# Patient Record
Sex: FEMALE | Race: OTHER | URBAN - METROPOLITAN AREA
[De-identification: names, ages, dates, MRNs, and addresses within clinical notes are randomized per-mention and may not be internally consistent; named-entity substitution may affect disease eponyms.]

---

## 2021-08-28 ENCOUNTER — TELEPHONE ENCOUNTER (OUTPATIENT)
Dept: URBAN - METROPOLITAN AREA CLINIC 13 | Facility: CLINIC | Age: 76
End: 2021-08-28

## 2021-08-29 ENCOUNTER — TELEPHONE ENCOUNTER (OUTPATIENT)
Dept: URBAN - METROPOLITAN AREA CLINIC 13 | Facility: CLINIC | Age: 76
End: 2021-08-29

## 2024-08-21 ENCOUNTER — OFFICE VISIT (OUTPATIENT)
Dept: URBAN - METROPOLITAN AREA CLINIC 48 | Facility: CLINIC | Age: 79
End: 2024-08-21

## 2024-09-20 ENCOUNTER — DASHBOARD ENCOUNTERS (OUTPATIENT)
Age: 79
End: 2024-09-20

## 2024-09-20 PROBLEM — 724556004: Status: ACTIVE | Noted: 2024-09-20

## 2024-09-23 ENCOUNTER — OFFICE VISIT (OUTPATIENT)
Dept: URBAN - METROPOLITAN AREA CLINIC 48 | Facility: CLINIC | Age: 79
End: 2024-09-23
Payer: COMMERCIAL

## 2024-09-23 VITALS
TEMPERATURE: 97.5 F | HEART RATE: 60 BPM | SYSTOLIC BLOOD PRESSURE: 176 MMHG | BODY MASS INDEX: 28.42 KG/M2 | DIASTOLIC BLOOD PRESSURE: 78 MMHG | WEIGHT: 170.6 LBS | HEIGHT: 65 IN

## 2024-09-23 DIAGNOSIS — R19.5 FECAL OCCULT BLOOD TEST POSITIVE: ICD-10-CM

## 2024-09-23 DIAGNOSIS — D50.0 IRON DEFICIENCY ANEMIA DUE TO CHRONIC BLOOD LOSS: ICD-10-CM

## 2024-09-23 PROCEDURE — 99204 OFFICE O/P NEW MOD 45 MIN: CPT | Performed by: INTERNAL MEDICINE

## 2024-09-23 RX ORDER — BLOOD-GLUCOSE METER
AS DIRECTED KIT MISCELLANEOUS
Refills: 0 | Status: ACTIVE | COMMUNITY

## 2024-09-23 RX ORDER — LEVOTHYROXINE SODIUM 88 UG/1
1 TABLET IN THE MORNING ON AN EMPTY STOMACH TABLET ORAL ONCE A DAY
Refills: 0 | Status: ACTIVE | COMMUNITY

## 2024-09-23 RX ORDER — METFORMIN HCL 1000 MG/1
1 TABLET WITH A MEAL TABLET, FILM COATED ORAL ONCE A DAY
Refills: 0 | Status: ACTIVE | COMMUNITY

## 2024-09-23 RX ORDER — METHYLPREDNISOLONE 4 MG/1
AS DIRECTED TABLET ORAL
Refills: 0 | Status: ACTIVE | COMMUNITY

## 2024-09-23 RX ORDER — SPIRONOLACTONE 50 MG/1
1 TABLET TABLET, COATED ORAL ONCE A DAY
Refills: 0 | Status: ACTIVE | COMMUNITY

## 2024-09-23 RX ORDER — ACETAMINOPHEN AND CODEINE PHOSPHATE 300; 30 MG/1; MG/1
1 TABLET AS NEEDED TABLET ORAL
Refills: 0 | Status: ACTIVE | COMMUNITY

## 2024-09-23 RX ORDER — ROSUVASTATIN CALCIUM 5 MG/1
1 TABLET TABLET ORAL ONCE A DAY
Refills: 0 | Status: ACTIVE | COMMUNITY

## 2024-09-23 RX ORDER — HYDRALAZINE HYDROCHLORIDE 25 MG/1
1 TABLET WITH FOOD TABLET ORAL TWICE A DAY
Refills: 0 | Status: ACTIVE | COMMUNITY

## 2024-09-23 RX ORDER — NETARSUDIL AND LATANOPROST OPHTHALMIC SOLUTION, 0.02%/0.005% .2; .05 MG/ML; MG/ML
1 DROP INTO AFFECTED EYE SOLUTION/ DROPS OPHTHALMIC; TOPICAL ONCE A DAY
Refills: 0 | Status: ACTIVE | COMMUNITY

## 2024-09-23 RX ORDER — OXYBUTYNIN CHLORIDE 15 MG/1
1 TABLET TABLET, EXTENDED RELEASE ORAL ONCE A DAY
Refills: 0 | Status: ACTIVE | COMMUNITY

## 2024-10-21 ENCOUNTER — OFFICE VISIT (OUTPATIENT)
Dept: URBAN - METROPOLITAN AREA CLINIC 48 | Facility: CLINIC | Age: 79
End: 2024-10-21
Payer: COMMERCIAL

## 2024-10-21 ENCOUNTER — LAB OUTSIDE AN ENCOUNTER (OUTPATIENT)
Dept: URBAN - METROPOLITAN AREA CLINIC 48 | Facility: CLINIC | Age: 79
End: 2024-10-21

## 2024-10-21 VITALS
TEMPERATURE: 97.5 F | WEIGHT: 167.8 LBS | HEIGHT: 65 IN | BODY MASS INDEX: 27.96 KG/M2 | DIASTOLIC BLOOD PRESSURE: 71 MMHG | SYSTOLIC BLOOD PRESSURE: 161 MMHG | HEART RATE: 61 BPM

## 2024-10-21 DIAGNOSIS — D50.0 IRON DEFICIENCY ANEMIA DUE TO CHRONIC BLOOD LOSS: ICD-10-CM

## 2024-10-21 DIAGNOSIS — R19.5 FECAL OCCULT BLOOD TEST POSITIVE: ICD-10-CM

## 2024-10-21 DIAGNOSIS — R10.13 EPIGASTRIC PAIN: ICD-10-CM

## 2024-10-21 PROCEDURE — 99214 OFFICE O/P EST MOD 30 MIN: CPT

## 2024-10-21 RX ORDER — BLOOD-GLUCOSE METER
AS DIRECTED KIT MISCELLANEOUS
Refills: 0 | Status: ACTIVE | COMMUNITY

## 2024-10-21 RX ORDER — METFORMIN HCL 1000 MG/1
1 TABLET WITH A MEAL TABLET, FILM COATED ORAL ONCE A DAY
Refills: 0 | Status: ACTIVE | COMMUNITY

## 2024-10-21 RX ORDER — HYDRALAZINE HYDROCHLORIDE 25 MG/1
1 TABLET WITH FOOD TABLET ORAL TWICE A DAY
Refills: 0 | Status: ACTIVE | COMMUNITY

## 2024-10-21 RX ORDER — SPIRONOLACTONE 50 MG/1
1 TABLET TABLET, COATED ORAL ONCE A DAY
Refills: 0 | Status: ACTIVE | COMMUNITY

## 2024-10-21 RX ORDER — ROSUVASTATIN CALCIUM 5 MG/1
1 TABLET TABLET ORAL ONCE A DAY
Refills: 0 | Status: ACTIVE | COMMUNITY

## 2024-10-21 RX ORDER — ACETAMINOPHEN AND CODEINE PHOSPHATE 300; 30 MG/1; MG/1
1 TABLET AS NEEDED TABLET ORAL
Refills: 0 | Status: ACTIVE | COMMUNITY

## 2024-10-21 RX ORDER — LEVOTHYROXINE SODIUM 88 UG/1
1 TABLET IN THE MORNING ON AN EMPTY STOMACH TABLET ORAL ONCE A DAY
Refills: 0 | Status: ACTIVE | COMMUNITY

## 2024-10-21 RX ORDER — NETARSUDIL AND LATANOPROST OPHTHALMIC SOLUTION, 0.02%/0.005% .2; .05 MG/ML; MG/ML
1 DROP INTO AFFECTED EYE SOLUTION/ DROPS OPHTHALMIC; TOPICAL ONCE A DAY
Refills: 0 | Status: ACTIVE | COMMUNITY

## 2024-10-21 RX ORDER — FAMOTIDINE 40 MG/1
1 TABLET AT BEDTIME TABLET, FILM COATED ORAL ONCE A DAY
Qty: 90 TABLET | Refills: 3 | OUTPATIENT
Start: 2024-10-21

## 2024-10-21 RX ORDER — METHYLPREDNISOLONE 4 MG/1
AS DIRECTED TABLET ORAL
Refills: 0 | Status: ACTIVE | COMMUNITY

## 2024-10-21 RX ORDER — OXYBUTYNIN CHLORIDE 15 MG/1
1 TABLET TABLET, EXTENDED RELEASE ORAL ONCE A DAY
Refills: 0 | Status: ACTIVE | COMMUNITY

## 2024-10-21 NOTE — PHYSICAL EXAM GASTROINTESTINAL
Abdomen , soft, tender in epigastric region, nondistended , no guarding or rigidity , no masses palpable , normal bowel sounds Liver and Spleen , no hepatosplenomegaly Rectal deferred

## 2024-10-21 NOTE — HPI-TODAY'S VISIT:
79-year-old female presents for follow-up anemia. Fecal occult blood test was positive on 7/3/2024, but patient denied melena/rectal bleeding. Patient complaining of intermittent epigastric pain, which she attributes to eating heavy foods. She takes mylanta or gasX as needed for her symptoms with some relief. Denies NSAID use. No heartburn/indigestion, N/V. She notes occasional episodes of loose stools since initiating metformin, but has regular formed BMs otherwise. Denies loss of weight or appetite. No known family hx CRC, polyps. She has never had a colonoscopy, and was reluctant to pursue EGD/colon at last OV. Labs 6/19/2024 showed creatinine elevated to 1.48 (1.63 in 3/2023). Hemoglobin was 10.0, stable since March 2023, with low MCV 78.9. Patient and  lost their eldest son 2 years ago, and went on a [place] cruise since last OV, and patient therefore preferred to recheck labs and follow-up regarding procedues. Most recent CBC shows hemoglobin stable at 10.5, MCV 75.8. Iron studies with iron 73, TIBC 286, % sat 26, ferritin 31.10. She is not currently on supplemental iron.  No known heart issues. Patient has SOBEIDA on CPAP, chronic kidney disease followed by nephrologist. No pacemaker/defibrillator, home O2, dialysis. No blood thinner use. She is diabetic, on metformin.

## 2024-11-15 ENCOUNTER — CLAIMS CREATED FROM THE CLAIM WINDOW (OUTPATIENT)
Dept: URBAN - METROPOLITAN AREA SURGERY CENTER 27 | Facility: SURGERY CENTER | Age: 79
End: 2024-11-15
Payer: COMMERCIAL

## 2024-11-15 ENCOUNTER — CLAIMS CREATED FROM THE CLAIM WINDOW (OUTPATIENT)
Dept: URBAN - METROPOLITAN AREA CLINIC 4 | Facility: CLINIC | Age: 79
End: 2024-11-15
Payer: COMMERCIAL

## 2024-11-15 ENCOUNTER — LAB OUTSIDE AN ENCOUNTER (OUTPATIENT)
Dept: URBAN - METROPOLITAN AREA SURGERY CENTER 27 | Facility: SURGERY CENTER | Age: 79
End: 2024-11-15

## 2024-11-15 DIAGNOSIS — K29.70 ERYTHEMATOUS GASTROPATHY: ICD-10-CM

## 2024-11-15 DIAGNOSIS — K63.89 OTHER SPECIFIED DISEASES OF INTESTINE: ICD-10-CM

## 2024-11-15 DIAGNOSIS — D12.0 ADENOMA OF CECUM: ICD-10-CM

## 2024-11-15 DIAGNOSIS — K21.9 ACID REFLUX: ICD-10-CM

## 2024-11-15 DIAGNOSIS — K29.70 GASTRITIS, UNSPECIFIED, WITHOUT BLEEDING: ICD-10-CM

## 2024-11-15 DIAGNOSIS — K29.70 CHRONIC ACITVE GASTRITIS (H.PYLORI NEGATIVE): ICD-10-CM

## 2024-11-15 DIAGNOSIS — K21.9 GASTRO-ESOPHAGEAL REFLUX DISEASE WITHOUT ESOPHAGITIS: ICD-10-CM

## 2024-11-15 DIAGNOSIS — D12.3 ADENOMA OF TRANSVERSE COLON: ICD-10-CM

## 2024-11-15 DIAGNOSIS — D12.3 BENIGN NEOPLASM OF TRANSVERSE COLON: ICD-10-CM

## 2024-11-15 DIAGNOSIS — D12.2 ADENOMA OF ASCENDING COLON: ICD-10-CM

## 2024-11-15 DIAGNOSIS — Z12.11 COLON CANCER SCREENING: ICD-10-CM

## 2024-11-15 DIAGNOSIS — K31.89 ACHYLIA: ICD-10-CM

## 2024-11-15 DIAGNOSIS — K31.89 OTHER DISEASES OF STOMACH AND DUODENUM: ICD-10-CM

## 2024-11-15 DIAGNOSIS — D12.2 BENIGN NEOPLASM OF ASCENDING COLON: ICD-10-CM

## 2024-11-15 DIAGNOSIS — D12.0 BENIGN NEOPLASM OF CECUM: ICD-10-CM

## 2024-11-15 DIAGNOSIS — D50.9 ANEMIA: ICD-10-CM

## 2024-11-15 PROCEDURE — 00813 ANES UPR LWR GI NDSC PX: CPT | Performed by: ANESTHESIOLOGY

## 2024-11-15 PROCEDURE — 45380 COLONOSCOPY AND BIOPSY: CPT | Performed by: INTERNAL MEDICINE

## 2024-11-15 PROCEDURE — 43239 EGD BIOPSY SINGLE/MULTIPLE: CPT | Performed by: INTERNAL MEDICINE

## 2024-11-15 PROCEDURE — 45381 COLONOSCOPY SUBMUCOUS NJX: CPT | Performed by: INTERNAL MEDICINE

## 2024-11-15 PROCEDURE — 88312 SPECIAL STAINS GROUP 1: CPT | Performed by: PATHOLOGY

## 2024-11-15 PROCEDURE — 88305 TISSUE EXAM BY PATHOLOGIST: CPT | Performed by: PATHOLOGY

## 2024-11-15 RX ORDER — METHYLPREDNISOLONE 4 MG/1
AS DIRECTED TABLET ORAL
Refills: 0 | Status: ACTIVE | COMMUNITY

## 2024-11-15 RX ORDER — OXYBUTYNIN CHLORIDE 15 MG/1
1 TABLET TABLET, EXTENDED RELEASE ORAL ONCE A DAY
Refills: 0 | Status: ACTIVE | COMMUNITY

## 2024-11-15 RX ORDER — ROSUVASTATIN CALCIUM 5 MG/1
1 TABLET TABLET ORAL ONCE A DAY
Refills: 0 | Status: ACTIVE | COMMUNITY

## 2024-11-15 RX ORDER — NETARSUDIL AND LATANOPROST OPHTHALMIC SOLUTION, 0.02%/0.005% .2; .05 MG/ML; MG/ML
1 DROP INTO AFFECTED EYE SOLUTION/ DROPS OPHTHALMIC; TOPICAL ONCE A DAY
Refills: 0 | Status: ACTIVE | COMMUNITY

## 2024-11-15 RX ORDER — ACETAMINOPHEN AND CODEINE PHOSPHATE 300; 30 MG/1; MG/1
1 TABLET AS NEEDED TABLET ORAL
Refills: 0 | Status: ACTIVE | COMMUNITY

## 2024-11-15 RX ORDER — SPIRONOLACTONE 50 MG/1
1 TABLET TABLET, COATED ORAL ONCE A DAY
Refills: 0 | Status: ACTIVE | COMMUNITY

## 2024-11-15 RX ORDER — LEVOTHYROXINE SODIUM 88 UG/1
1 TABLET IN THE MORNING ON AN EMPTY STOMACH TABLET ORAL ONCE A DAY
Refills: 0 | Status: ACTIVE | COMMUNITY

## 2024-11-15 RX ORDER — METFORMIN HCL 1000 MG/1
1 TABLET WITH A MEAL TABLET, FILM COATED ORAL ONCE A DAY
Refills: 0 | Status: ACTIVE | COMMUNITY

## 2024-11-15 RX ORDER — HYDRALAZINE HYDROCHLORIDE 25 MG/1
1 TABLET WITH FOOD TABLET ORAL TWICE A DAY
Refills: 0 | Status: ACTIVE | COMMUNITY

## 2024-11-15 RX ORDER — FAMOTIDINE 40 MG/1
1 TABLET AT BEDTIME TABLET, FILM COATED ORAL ONCE A DAY
Qty: 90 TABLET | Refills: 3 | Status: ACTIVE | COMMUNITY
Start: 2024-10-21

## 2024-11-15 RX ORDER — BLOOD-GLUCOSE METER
AS DIRECTED KIT MISCELLANEOUS
Refills: 0 | Status: ACTIVE | COMMUNITY

## 2024-11-25 ENCOUNTER — TELEPHONE ENCOUNTER (OUTPATIENT)
Dept: URBAN - METROPOLITAN AREA CLINIC 48 | Facility: CLINIC | Age: 79
End: 2024-11-25

## 2024-12-04 ENCOUNTER — TELEPHONE ENCOUNTER (OUTPATIENT)
Dept: URBAN - METROPOLITAN AREA CLINIC 46 | Facility: CLINIC | Age: 79
End: 2024-12-04

## 2025-02-25 ENCOUNTER — OFFICE VISIT (OUTPATIENT)
Dept: URBAN - METROPOLITAN AREA CLINIC 44 | Facility: CLINIC | Age: 80
End: 2025-02-25

## 2025-02-25 RX ORDER — SPIRONOLACTONE 50 MG/1
1 TABLET TABLET, COATED ORAL ONCE A DAY
Refills: 0 | Status: ACTIVE | COMMUNITY

## 2025-02-25 RX ORDER — ACETAMINOPHEN AND CODEINE PHOSPHATE 300; 30 MG/1; MG/1
1 TABLET AS NEEDED TABLET ORAL
Refills: 0 | Status: ACTIVE | COMMUNITY

## 2025-02-25 RX ORDER — ROSUVASTATIN CALCIUM 5 MG/1
1 TABLET TABLET ORAL ONCE A DAY
Refills: 0 | Status: ACTIVE | COMMUNITY

## 2025-02-25 RX ORDER — LEVOTHYROXINE SODIUM 88 UG/1
1 TABLET IN THE MORNING ON AN EMPTY STOMACH TABLET ORAL ONCE A DAY
Refills: 0 | Status: ACTIVE | COMMUNITY

## 2025-02-25 RX ORDER — FAMOTIDINE 40 MG/1
1 TABLET AT BEDTIME TABLET, FILM COATED ORAL ONCE A DAY
Qty: 90 TABLET | Refills: 3 | Status: ACTIVE | COMMUNITY
Start: 2024-10-21

## 2025-02-25 RX ORDER — BLOOD-GLUCOSE METER
AS DIRECTED KIT MISCELLANEOUS
Refills: 0 | Status: ACTIVE | COMMUNITY

## 2025-02-25 RX ORDER — OXYBUTYNIN CHLORIDE 15 MG/1
1 TABLET TABLET, EXTENDED RELEASE ORAL ONCE A DAY
Refills: 0 | Status: ACTIVE | COMMUNITY

## 2025-02-25 RX ORDER — NETARSUDIL AND LATANOPROST OPHTHALMIC SOLUTION, 0.02%/0.005% .2; .05 MG/ML; MG/ML
1 DROP INTO AFFECTED EYE SOLUTION/ DROPS OPHTHALMIC; TOPICAL ONCE A DAY
Refills: 0 | Status: ACTIVE | COMMUNITY

## 2025-02-25 RX ORDER — METFORMIN HCL 1000 MG/1
1 TABLET WITH A MEAL TABLET, FILM COATED ORAL ONCE A DAY
Refills: 0 | Status: ACTIVE | COMMUNITY

## 2025-02-25 RX ORDER — METHYLPREDNISOLONE 4 MG/1
AS DIRECTED TABLET ORAL
Refills: 0 | Status: ACTIVE | COMMUNITY

## 2025-02-25 RX ORDER — HYDRALAZINE HYDROCHLORIDE 25 MG/1
1 TABLET WITH FOOD TABLET ORAL TWICE A DAY
Refills: 0 | Status: ACTIVE | COMMUNITY

## 2025-04-29 ENCOUNTER — OFFICE VISIT (OUTPATIENT)
Dept: URBAN - METROPOLITAN AREA CLINIC 44 | Facility: CLINIC | Age: 80
End: 2025-04-29
Payer: COMMERCIAL

## 2025-04-29 DIAGNOSIS — Z86.0100 PERSONAL HISTORY OF COLONIC POLYPS: ICD-10-CM

## 2025-04-29 PROCEDURE — 99213 OFFICE O/P EST LOW 20 MIN: CPT | Performed by: INTERNAL MEDICINE

## 2025-04-29 RX ORDER — HYDRALAZINE HYDROCHLORIDE 25 MG/1
1 TABLET WITH FOOD TABLET ORAL TWICE A DAY
Refills: 0 | Status: ACTIVE | COMMUNITY

## 2025-04-29 RX ORDER — ROSUVASTATIN CALCIUM 5 MG/1
1 TABLET TABLET ORAL ONCE A DAY
Refills: 0 | Status: ACTIVE | COMMUNITY

## 2025-04-29 RX ORDER — SPIRONOLACTONE 50 MG/1
1 TABLET TABLET, COATED ORAL ONCE A DAY
Refills: 0 | Status: ACTIVE | COMMUNITY

## 2025-04-29 RX ORDER — LEVOTHYROXINE SODIUM 88 UG/1
1 TABLET IN THE MORNING ON AN EMPTY STOMACH TABLET ORAL ONCE A DAY
Refills: 0 | Status: ACTIVE | COMMUNITY

## 2025-04-29 RX ORDER — OXYBUTYNIN CHLORIDE 15 MG/1
1 TABLET TABLET, EXTENDED RELEASE ORAL ONCE A DAY
Refills: 0 | Status: ACTIVE | COMMUNITY

## 2025-04-29 RX ORDER — BLOOD-GLUCOSE METER
AS DIRECTED KIT MISCELLANEOUS
Refills: 0 | Status: ACTIVE | COMMUNITY

## 2025-04-29 RX ORDER — NETARSUDIL AND LATANOPROST OPHTHALMIC SOLUTION, 0.02%/0.005% .2; .05 MG/ML; MG/ML
1 DROP INTO AFFECTED EYE SOLUTION/ DROPS OPHTHALMIC; TOPICAL ONCE A DAY
Refills: 0 | Status: ACTIVE | COMMUNITY

## 2025-04-29 RX ORDER — ACETAMINOPHEN AND CODEINE PHOSPHATE 300; 30 MG/1; MG/1
1 TABLET AS NEEDED TABLET ORAL
Refills: 0 | Status: ACTIVE | COMMUNITY

## 2025-04-29 RX ORDER — FAMOTIDINE 40 MG/1
1 TABLET AT BEDTIME TABLET, FILM COATED ORAL ONCE A DAY
Qty: 90 TABLET | Refills: 3 | Status: ACTIVE | COMMUNITY
Start: 2024-10-21

## 2025-04-29 RX ORDER — METHYLPREDNISOLONE 4 MG/1
AS DIRECTED TABLET ORAL
Refills: 0 | Status: ACTIVE | COMMUNITY

## 2025-04-29 RX ORDER — METFORMIN HCL 1000 MG/1
1 TABLET WITH A MEAL TABLET, FILM COATED ORAL ONCE A DAY
Refills: 0 | Status: ACTIVE | COMMUNITY

## 2025-04-29 NOTE — HPI-TODAY'S VISIT:
80-year-old female here for follow-up with a history of colon polyp. She has a recent right hemicolectomy performed by Dr Narayanan in 01/2025. 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 All lymph nodes were negative.   EGD/colonoscopy  11/2024 polyp in cecum and transverse colon Cecal polyp too large to remove endoscopically. Transverse colon marked. That was her first colon. Transverse polyp base was removed 12/12/24. It was adenoma with high grade dysplasia. The patient states she has one normal bowel movement daily. Good appetite. Stable weight. Denies abdominal pain, denies nausea, denies vomiting.She did have a prolonged Hospital stay. Does see pulmonary for CPAP machine for sleep apnea.